# Patient Record
(demographics unavailable — no encounter records)

---

## 2025-03-26 NOTE — HISTORY OF PRESENT ILLNESS
[de-identified] : 64-year-old male presents for initial evaluation for inspire consultation, sleep apnea.  Reports that he has history of sleep apnea. Prescribed CPAP machine for about 2 months with no improvement. Not able to tolerate due to being uncomfortable. Complaining of increased fatigue during the day from not sleeping at night. Feeling it is affecting his daily life now. Last sleep study 2022/2023.

## 2025-03-26 NOTE — ASSESSMENT
[FreeTextEntry1] : Discussed ANNAMARIA and its effects on cardiovascular and metabolic health Discussed options for treatment including gold standard of CPAP and option of surgical salvage including upper airway stimulation Disks risks and rationale of UAS (Inspire implant) Plan for drug-induced sleep endoscopy Risks of DISE discussed including brief anesthetic and airway issues

## 2025-03-26 NOTE — REASON FOR VISIT
[Initial Evaluation] : an initial evaluation for [FreeTextEntry2] : inspire consultation, sleep apnea

## 2025-03-26 NOTE — PROCEDURE
[Complicated Symptoms] : complicated symptoms requiring more thorough examination than provided by mirror [None] : none [Flexible Endoscope] : examined with the flexible endoscope [de-identified] : Flexible laryngoscopy performed. Nasal cavity, nasopharynx, oropharynx, hypopharynx, and larynx evaluated. No masses or lesions, bilateral true vocal folds symmetric and mobile.

## 2025-06-06 NOTE — REASON FOR VISIT
[Subsequent Evaluation] : a subsequent evaluation for [FreeTextEntry2] : ANNAMARIA, snoring, s/p sleep endoscopy.

## 2025-06-06 NOTE — HISTORY OF PRESENT ILLNESS
[FreeTextEntry1] : Patient returns today following up on ANNAMARIA, snoring, s/p sleep endoscopy. Reports that his symptoms are the same. No further complaints or concerns. Wanted to go over sleep endoscopy.

## 2025-06-06 NOTE — ASSESSMENT
[FreeTextEntry1] : DISE reviewed with concentric velopharyngeal collapse - not a candidate for Inspire currently Discussed options including weight loss with follow up DISE vs. UPPP Opts for UPPP Plan for tonsillectomy, adenoidectomy, lateral expansion pharyngoplasty  Risks of tonsil surgery were discussed including bleeding, pain, dehydration, nonimprovement

## 2025-07-21 NOTE — HISTORY OF PRESENT ILLNESS
[FreeTextEntry1] : Patient here today ANNAMARIA, snoring, s/p bilateral tonsillectomy and adenoidectomy 07/01/2025 .  Patient states the sutures in his neck are causing discomfort.  He is eating and drinking well.

## 2025-07-21 NOTE — REASON FOR VISIT
[Subsequent Evaluation] : a subsequent evaluation for [FreeTextEntry2] : ANNAMARIA, snoring, s/p UPPP 07/01/2025

## 2025-07-21 NOTE — PHYSICAL EXAM
[Normal] : mucosa is normal [Midline] : trachea located in midline position [Removed] : palatine tonsils previously removed [de-identified] : healing mucosa